# Patient Record
Sex: FEMALE | Race: OTHER | NOT HISPANIC OR LATINO | URBAN - METROPOLITAN AREA
[De-identification: names, ages, dates, MRNs, and addresses within clinical notes are randomized per-mention and may not be internally consistent; named-entity substitution may affect disease eponyms.]

---

## 2021-09-27 ENCOUNTER — OUTPATIENT (OUTPATIENT)
Dept: OUTPATIENT SERVICES | Facility: HOSPITAL | Age: 66
LOS: 1 days | Discharge: HOME | End: 2021-09-27
Payer: COMMERCIAL

## 2021-09-27 PROCEDURE — 88304 TISSUE EXAM BY PATHOLOGIST: CPT | Mod: 26

## 2021-09-27 PROCEDURE — 88300 SURGICAL PATH GROSS: CPT | Mod: 26,59

## 2021-09-27 RX ORDER — OXYCODONE HYDROCHLORIDE 5 MG/1
1 TABLET ORAL
Qty: 20 | Refills: 0
Start: 2021-09-27

## 2021-09-27 RX ORDER — DOCUSATE SODIUM 100 MG
1 CAPSULE ORAL
Qty: 15 | Refills: 0
Start: 2021-09-27

## 2021-09-27 NOTE — BRIEF OPERATIVE NOTE - NSICDXBRIEFPROCEDURE_GEN_ALL_CORE_FT
PROCEDURES:  Laparoscopic cholecystectomy without cholangiography 27-Sep-2021 14:15:18  Lisa Morrell

## 2021-09-27 NOTE — BRIEF OPERATIVE NOTE - OPERATION/FINDINGS
Koffi technique used to place infraumbilical port. Additional 5 mm working ports placed under direct visualization. Critical view of safety obtained, cystic duct and artery clipped and cut with endoshears. Gallbladder dissected from gallbladder fossa with hook electrocautery and placed in endocatch bag, removed from abdomen. Abdomen desufflated and reinsufflated with hemostasis achieved. Umbilical fascia closed with figure of 8 vicryl stitches, skin at all sites closed with monocryl and dermabond. Local anesthesia administered.

## 2021-10-06 LAB — SURGICAL PATHOLOGY STUDY: SIGNIFICANT CHANGE UP

## 2023-07-03 ENCOUNTER — OUTPATIENT (OUTPATIENT)
Dept: OUTPATIENT SERVICES | Facility: HOSPITAL | Age: 68
LOS: 1 days | End: 2023-07-03
Payer: COMMERCIAL

## 2023-07-03 PROCEDURE — 73140 X-RAY EXAM OF FINGER(S): CPT | Mod: 26,LT

## 2023-07-03 PROCEDURE — 73140 X-RAY EXAM OF FINGER(S): CPT
